# Patient Record
Sex: FEMALE | Race: WHITE | Employment: OTHER | URBAN - METROPOLITAN AREA
[De-identification: names, ages, dates, MRNs, and addresses within clinical notes are randomized per-mention and may not be internally consistent; named-entity substitution may affect disease eponyms.]

---

## 2023-09-09 PROBLEM — E03.9 HYPOTHYROIDISM: Status: ACTIVE | Noted: 2023-09-09

## 2023-09-09 PROBLEM — I10 HYPERTENSION: Status: ACTIVE | Noted: 2023-09-09

## 2023-09-09 PROBLEM — R00.1 BRADYCARDIA: Status: ACTIVE | Noted: 2023-09-09

## 2023-09-09 PROBLEM — F64.0 TRANSSEXUALISM: Status: ACTIVE | Noted: 2023-09-09

## 2023-09-09 PROBLEM — K04.7 TOOTH ABSCESS: Status: ACTIVE | Noted: 2023-09-09

## 2023-09-09 RX ORDER — METOPROLOL TARTRATE 50 MG/1
50 TABLET ORAL 2 TIMES DAILY
COMMUNITY
Start: 2023-03-17 | End: 2024-03-21 | Stop reason: SDUPTHER

## 2023-09-09 RX ORDER — LEVOTHYROXINE SODIUM 88 UG/1
88 TABLET ORAL
COMMUNITY
Start: 2023-04-17 | End: 2024-03-21 | Stop reason: SDUPTHER

## 2023-09-09 RX ORDER — LOSARTAN POTASSIUM AND HYDROCHLOROTHIAZIDE 12.5; 1 MG/1; MG/1
1 TABLET ORAL DAILY
COMMUNITY
Start: 2023-03-17 | End: 2024-03-21 | Stop reason: SDUPTHER

## 2023-09-09 RX ORDER — ESTRADIOL 2 MG/1
4 TABLET ORAL DAILY
COMMUNITY
End: 2023-10-11

## 2024-01-29 DIAGNOSIS — F64.0 TRANSSEXUALISM: ICD-10-CM

## 2024-01-30 RX ORDER — ESTRADIOL 2 MG/1
4 TABLET ORAL DAILY
Qty: 180 TABLET | Refills: 0 | Status: SHIPPED | OUTPATIENT
Start: 2024-01-30 | End: 2024-03-21 | Stop reason: SDUPTHER

## 2024-03-19 PROBLEM — K04.7 DENTAL ABSCESS: Status: RESOLVED | Noted: 2024-03-19 | Resolved: 2024-03-19

## 2024-03-21 ENCOUNTER — OFFICE VISIT (OUTPATIENT)
Dept: PRIMARY CARE | Facility: CLINIC | Age: 64
End: 2024-03-21

## 2024-03-21 VITALS
WEIGHT: 212 LBS | BODY MASS INDEX: 34.07 KG/M2 | TEMPERATURE: 97.8 F | SYSTOLIC BLOOD PRESSURE: 124 MMHG | HEIGHT: 66 IN | HEART RATE: 75 BPM | OXYGEN SATURATION: 97 % | DIASTOLIC BLOOD PRESSURE: 60 MMHG

## 2024-03-21 DIAGNOSIS — F64.0 TRANSSEXUALISM: ICD-10-CM

## 2024-03-21 DIAGNOSIS — Z00.00 ANNUAL PHYSICAL EXAM: Primary | ICD-10-CM

## 2024-03-21 DIAGNOSIS — I10 PRIMARY HYPERTENSION: ICD-10-CM

## 2024-03-21 DIAGNOSIS — R00.1 BRADYCARDIA: ICD-10-CM

## 2024-03-21 DIAGNOSIS — E03.9 ACQUIRED HYPOTHYROIDISM: ICD-10-CM

## 2024-03-21 PROBLEM — K04.7 TOOTH ABSCESS: Status: RESOLVED | Noted: 2023-09-09 | Resolved: 2024-03-21

## 2024-03-21 PROCEDURE — 3074F SYST BP LT 130 MM HG: CPT | Performed by: INTERNAL MEDICINE

## 2024-03-21 PROCEDURE — 3078F DIAST BP <80 MM HG: CPT | Performed by: INTERNAL MEDICINE

## 2024-03-21 PROCEDURE — 1036F TOBACCO NON-USER: CPT | Performed by: INTERNAL MEDICINE

## 2024-03-21 PROCEDURE — 99396 PREV VISIT EST AGE 40-64: CPT | Performed by: INTERNAL MEDICINE

## 2024-03-21 RX ORDER — LOSARTAN POTASSIUM AND HYDROCHLOROTHIAZIDE 12.5; 1 MG/1; MG/1
1 TABLET ORAL DAILY
Qty: 90 TABLET | Refills: 3 | Status: SHIPPED | OUTPATIENT
Start: 2024-03-21 | End: 2025-03-21

## 2024-03-21 RX ORDER — ESTRADIOL 2 MG/1
4 TABLET ORAL DAILY
Qty: 180 TABLET | Refills: 0 | Status: SHIPPED | OUTPATIENT
Start: 2024-03-21

## 2024-03-21 RX ORDER — METOPROLOL TARTRATE 50 MG/1
50 TABLET ORAL 2 TIMES DAILY
Qty: 180 TABLET | Refills: 3 | Status: SHIPPED | OUTPATIENT
Start: 2024-03-21 | End: 2025-03-21

## 2024-03-21 RX ORDER — LEVOTHYROXINE SODIUM 88 UG/1
88 TABLET ORAL
Qty: 90 TABLET | Refills: 3 | Status: SHIPPED | OUTPATIENT
Start: 2024-03-21 | End: 2025-03-21

## 2024-03-21 ASSESSMENT — PAIN SCALES - GENERAL: PAINLEVEL: 0-NO PAIN

## 2024-03-21 ASSESSMENT — PATIENT HEALTH QUESTIONNAIRE - PHQ9
SUM OF ALL RESPONSES TO PHQ9 QUESTIONS 1 AND 2: 0
1. LITTLE INTEREST OR PLEASURE IN DOING THINGS: NOT AT ALL
2. FEELING DOWN, DEPRESSED OR HOPELESS: NOT AT ALL

## 2024-03-21 NOTE — PROGRESS NOTES
[facility]: [facility]  PROGRESS NOTE      Araceli Reed is a 63 y.o. male that is being seen  today for Annual Exam.  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  Patient has hypertension and is fairly controlled.  Patient is on hormonal treatment for transsexualism.  He denies any significant complaints.  He is due for blood work to be done.  Patient does not want to do any screening colonoscopy nor want to have any vaccination.      ROS  Negative for fever or chills  Negative for sore throat, ear pain, nasal discharge  Negative for cough, shortness of breath or wheezing  Negative for chest pain, palpitations, swelling of legs  Negative for abdominal pain, constipation, diarrhea, blood in the stools  Negative for urinary complaints  Negative for headache, dizziness, weakness or numbness  Negative for joint pain  Negative for depression or anxiety  All other systems reviewed and were negative   Vitals:    03/21/24 1058   BP: 124/60   Pulse: 75   Temp: 36.6 °C (97.8 °F)   SpO2: 97%      Vitals:    03/21/24 1058   Weight: 96.2 kg (212 lb)     Body mass index is 34.23 kg/m².  Physical Exam  Constitutional: Patient does not appear to be in any acute distress  Head and Face: NCAT  Eyes: Normal external exam, EOMI  ENT: Normal external inspection of ears and nose. Oropharynx normal.  Cardiovascular: RRR, S1/S2, no murmurs, rubs, or gallops, radial pulses +2, no edema of extremities  Pulmonary: CTAB, no respiratory distress.  Abdomen: +BS, soft, non-tender, nondistended, no guarding or rebound, no masses noted  MSK: No joint swelling, normal movements of all extremities. Range of motion- normal.  Skin- No lesions, contusions, or erythema.  Peripheral puslses palpable bilaterally 2+  Neuro: AAO X3, Cranial nerves 2-12 grossly intact,DTR 2+ in all 4 limbs   Psychiatric: Judgment intact. Appropriate mood and behavior    LABS  "  [unfilled]  No results found for: \"GLUCOSE\", \"CALCIUM\", \"NA\", \"K\", \"CO2\", \"CL\", \"BUN\", \"CREATININE\"  No results found for: \"ALT\", \"AST\", \"GGT\", \"ALKPHOS\", \"BILITOT\"  No results found for: \"WBC\", \"HGB\", \"HCT\", \"MCV\", \"PLT\"  No results found for: \"CHOL\"  No results found for: \"HDL\"  No results found for: \"LDLCALC\"  No results found for: \"TRIG\"  No results found for: \"HGBA1C\"  Other labs not included in the list above were reviewed either before or during this encounter.    History    Past Medical History:   Diagnosis Date    Bradycardia     Dental abscess 03/19/2024    HTN (hypertension)     Hypothyroidism     Transsexualism      History reviewed. No pertinent surgical history.  Family History   Problem Relation Name Age of Onset    Other (hypothyroid) Mother      Heart attack Father      Hypertension Father      Stroke Father       No Known Allergies  Current Outpatient Medications on File Prior to Visit   Medication Sig Dispense Refill    aspirin 81 mg capsule Take 1 capsule by mouth once daily.      estradiol (Estrace) 2 mg tablet TAKE TWO TABLETS BY MOUTH EVERY  tablet 0    levothyroxine (Synthroid, Levoxyl) 88 mcg tablet Take 1 tablet (88 mcg) by mouth once daily in the morning. Take before meals. On an empty stomach      losartan-hydrochlorothiazide (Hyzaar) 100-12.5 mg tablet Take 1 tablet by mouth once daily.      metoprolol tartrate (Lopressor) 50 mg tablet Take 1 tablet by mouth 2 times a day.       No current facility-administered medications on file prior to visit.       There is no immunization history on file for this patient.  Patient's medical history was reviewed and updated either before or during this encounter.  ASSESSMENT / PLAN:  Diagnoses and all orders for this visit:  Annual physical exam  Acquired hypothyroidism  -     TSH with reflex to Free T4 if abnormal; Future  Primary hypertension  -     CBC; Future  -     Comprehensive Metabolic Panel; Future  -     Lipid Panel; " Future  Transsexualism  Bradycardia          Gerard Bartholomew MD

## 2024-03-25 ENCOUNTER — DOCUMENTATION (OUTPATIENT)
Dept: PRIMARY CARE | Facility: CLINIC | Age: 64
End: 2024-03-25

## 2024-03-25 ENCOUNTER — TELEPHONE (OUTPATIENT)
Dept: PRIMARY CARE | Facility: CLINIC | Age: 64
End: 2024-03-25

## 2024-03-25 DIAGNOSIS — E03.9 ACQUIRED HYPOTHYROIDISM: Primary | ICD-10-CM

## 2024-03-25 LAB
ALANINE AMINOTRANSFERASE (SGPT) (U/L) IN SER/PLAS EXTERNAL: 12 U/L
ALBUMIN (G/DL) IN SER/PLAS EXTERNAL: 4.6 G/DL
ALKALINE PHOSPHATASE (U/L) IN SER/PLAS EXTERNAL: 64 U/L
ASPARTATE AMINOTRANSFERASE (SGOT) (U/L) IN SER/PLAS EXTERNAL: 15 U/L
BILIRUBIN TOTAL (MG/DL) IN SER/PLAS EXTERNAL: 0.7 MG/DL
CALCIUM (MG/DL) IN SER/PLAS EXTERNAL: 9.6 MG/DL
CARBON DIOXIDE, TOTAL (MMOL/L) IN SER/PLAS EXTERNAL: 25 MMOL/L
CHLORIDE (MMOL/L) IN SER/PLAS EXTERNAL: 99 MMOL/L
CHOLESTEROL (MG/DL) IN SER/PLAS EXTERNAL: 179 MG/DL
CHOLESTEROL IN HDL (MG/DL) IN SER/PLAS EXTERNAL: 52 MG/DL
CHOLESTEROL IN LDL (MG/DL) IN SERUM OR PLASMA BY CALCULATION EXTERNAL: 107 MG/DL
CREATININE (MG/DL) IN SER/PLAS EXTERNAL: 0.91 MG/DL
ERYTHROCYTE DISTRIBUTION WIDTH (RATIO) BY AUTOMATED COUNT EXTERNAL: 12.3 %
ERYTHROCYTE MEAN CORPUSCULAR HEMOGLOBIN (PG) BY AUTOMATED COUNT EXTERNAL: 31.8 PG
ERYTHROCYTE MEAN CORPUSCULAR HGB CONCENTRATION (G/DL) BY AUTOMATED EXT: 33.6 G/DL
ERYTHROCYTE MEAN CORPUSCULAR VOLUME (FL) BY AUTOMATED COUNT EXTERNAL: 95 FL
ERYTHROCYTES (10*6/UL) IN BLOOD BY AUTOMATED COUNT EXTERNAL: 5.16 X10*6/UL
GLOMERULAR FILTRATION RATE ML/MIN/1.73 SQ M.PREDICTED EXTERNAL: 66 ML/MIN/1.73M*2
GLUCOSE (MG/DL) IN SER/PLAS EXTERNAL: 90 MG/DL
HEMATOCRIT (%) IN BLOOD BY AUTOMATED COUNT EXTERNAL: 48.8 %
HEMOGLOBIN (G/DL) IN BLOOD EXTERNAL: 16.4 G/DL
LEUKOCYTES (10*3/UL) IN BLOOD BY AUTOMATED COUNT EXTERNAL: 6.5 X10*3/UL
PLATELETS (10*3/UL) IN BLOOD AUTOMATED COUNT EXTERNAL: 259 X10*3/UL
POTASSIUM (MMOL/L) IN SER/PLAS EXTERMA;: 4.6 MMOL/L
PROTEIN TOTAL EXTERNAL: 7.4 G/DL
SODIUM (MMOL/L) IN SER/PLAS EXTERNAL: 138 MMOL/L
THYROTROPIN (MIU/L) IN SER/PLAS BY DETECTION LIMIT <= 0.05 MIU/L EXTERNAL: 7.05 MIU/L
TRIGLYCERIDE (MG/DL) IN SER/PLAS EXTERNAL: 112 MG/DL
UREA NITROGEN (MG/DL) IN SER/PLAS EXTERNAL: 23 MG/DL
VLDL EXTERNAL: 20 MG/DL

## 2024-03-25 NOTE — TELEPHONE ENCOUNTER
----- Message from Gerard Bartholomew MD sent at 3/25/2024 11:57 AM EDT -----  Mild elevation of TSH level from before.All other labs are normal.Pt. need to take levothyroxine daily and recheck TSH LEVEL IN 2 MONTHS

## 2024-03-25 NOTE — TELEPHONE ENCOUNTER
Spoke with pt about results, she said she has been taking the thyroid medication before food but was mixing it with other meds. Will starting taking it solo then recheck her BW, lab order mailed to her

## 2024-07-12 DIAGNOSIS — F64.0 TRANSSEXUALISM: ICD-10-CM

## 2024-07-12 RX ORDER — ESTRADIOL 2 MG/1
4 TABLET ORAL DAILY
Qty: 180 TABLET | Refills: 0 | Status: SHIPPED | OUTPATIENT
Start: 2024-07-12

## 2024-10-18 DIAGNOSIS — F64.0 TRANSSEXUALISM: ICD-10-CM

## 2024-10-18 RX ORDER — ESTRADIOL 2 MG/1
4 TABLET ORAL DAILY
Qty: 180 TABLET | Refills: 1 | Status: SHIPPED | OUTPATIENT
Start: 2024-10-18

## 2025-03-27 ENCOUNTER — OFFICE VISIT (OUTPATIENT)
Dept: PRIMARY CARE | Facility: CLINIC | Age: 65
End: 2025-03-27

## 2025-03-27 VITALS
HEIGHT: 66 IN | TEMPERATURE: 96.5 F | BODY MASS INDEX: 34.07 KG/M2 | OXYGEN SATURATION: 97 % | HEART RATE: 53 BPM | SYSTOLIC BLOOD PRESSURE: 120 MMHG | WEIGHT: 212 LBS | DIASTOLIC BLOOD PRESSURE: 88 MMHG

## 2025-03-27 DIAGNOSIS — Z00.00 ANNUAL PHYSICAL EXAM: Primary | ICD-10-CM

## 2025-03-27 DIAGNOSIS — E03.9 ACQUIRED HYPOTHYROIDISM: ICD-10-CM

## 2025-03-27 DIAGNOSIS — F64.0 TRANSSEXUALISM: ICD-10-CM

## 2025-03-27 DIAGNOSIS — I10 PRIMARY HYPERTENSION: ICD-10-CM

## 2025-03-27 PROCEDURE — 99396 PREV VISIT EST AGE 40-64: CPT | Performed by: INTERNAL MEDICINE

## 2025-03-27 PROCEDURE — 3008F BODY MASS INDEX DOCD: CPT | Performed by: INTERNAL MEDICINE

## 2025-03-27 PROCEDURE — 3074F SYST BP LT 130 MM HG: CPT | Performed by: INTERNAL MEDICINE

## 2025-03-27 PROCEDURE — 1036F TOBACCO NON-USER: CPT | Performed by: INTERNAL MEDICINE

## 2025-03-27 PROCEDURE — 3079F DIAST BP 80-89 MM HG: CPT | Performed by: INTERNAL MEDICINE

## 2025-03-27 ASSESSMENT — ENCOUNTER SYMPTOMS
DEPRESSION: 0
LOSS OF SENSATION IN FEET: 0
OCCASIONAL FEELINGS OF UNSTEADINESS: 0

## 2025-03-27 ASSESSMENT — PAIN SCALES - GENERAL: PAINLEVEL_OUTOF10: 0-NO PAIN

## 2025-03-27 NOTE — PROGRESS NOTES
[facility]: [facility]  PROGRESS NOTE      Araceli Reed is a 64 y.o. female that is being seen  today for Annual Exam.  Subjective     Patient is a 64-year-old transgender female with a history of hypertension  and hypothyroidism who is being seen for annual physical examination patient has been doing well.  Patient needs her lab work to be done.  Denies any issues with the medication.  Up-to-date on screenings.      ROS  Negative for fever or chills  Negative for sore throat, ear pain, nasal discharge  Negative for cough, shortness of breath or wheezing  Negative for chest pain, palpitations, swelling of legs  Negative for abdominal pain, constipation, diarrhea, blood in the stools  Negative for urinary complaints  Negative for headache, dizziness, weakness or numbness  Negative for joint pain  Negative for depression or anxiety  All other systems reviewed and were negative   Vitals:    03/27/25 1112   BP: 120/88   Pulse: 53   Temp: 35.8 °C (96.5 °F)   SpO2: 97%      Vitals:    03/27/25 1112   Weight: 96.2 kg (212 lb)     Body mass index is 34.24 kg/m².  Physical Exam  Constitutional: Patient does not appear to be in any acute distress  Head and Face: NCAT  Eyes: Normal external exam, EOMI  ENT: Normal external inspection of ears and nose. Oropharynx normal.  Cardiovascular: RRR, S1/S2, no murmurs, rubs, or gallops, radial pulses +2, no edema of extremities  Pulmonary: CTAB, no respiratory distress.  Abdomen: +BS, soft, non-tender, nondistended, no guarding or rebound, no masses noted  MSK: No joint swelling, normal movements of all extremities. Range of motion- normal.  Skin- No lesions, contusions, or erythema.  Peripheral puslses palpable bilaterally 2+  Neuro: AAO X3, Cranial nerves 2-12 grossly intact,DTR 2+ in all 4 limbs   Psychiatric: Judgment intact. Appropriate mood and behavior    LABS   [unfilled]  Lab Results   Component Value Date    GLUCOSE 90 03/22/2024    CALCIUM 9.6  "03/22/2024     03/22/2024    K 4.6 03/22/2024    CO2 25 03/22/2024    CL 99 03/22/2024    BUN 23 03/22/2024    CREATININE 0.91 03/22/2024     Lab Results   Component Value Date    ALT 12 03/22/2024    AST 15 03/22/2024    ALKPHOS 64 03/22/2024    BILITOT 0.7 03/22/2024     Lab Results   Component Value Date    WBC 6.5 03/22/2024    HGB 16.4 03/22/2024    HCT 48.8 03/22/2024    MCV 95 03/22/2024     03/22/2024     Lab Results   Component Value Date    CHOL 179 03/22/2024     Lab Results   Component Value Date    HDL 52.0 03/22/2024     Lab Results   Component Value Date    LDLCALC 107 03/22/2024     Lab Results   Component Value Date    TRIG 112 03/22/2024     No results found for: \"HGBA1C\"  Other labs not included in the list above were reviewed either before or during this encounter.    History    Past Medical History:   Diagnosis Date    Bradycardia     Dental abscess 03/19/2024    HTN (hypertension)     Hypothyroidism     Transsexualism      History reviewed. No pertinent surgical history.  Family History   Problem Relation Name Age of Onset    Other (hypothyroid) Mother      Heart attack Father      Hypertension Father      Stroke Father       No Known Allergies  Current Outpatient Medications on File Prior to Visit   Medication Sig Dispense Refill    aspirin 81 mg capsule Take 1 capsule by mouth once daily.      estradiol (Estrace) 2 mg tablet TAKE TWO TABLETS BY MOUTH ONCE A  tablet 1    levothyroxine (Synthroid, Levoxyl) 88 mcg tablet Take 1 tablet (88 mcg) by mouth once daily in the morning. Take before meals. On an empty stomach 90 tablet 3    losartan-hydrochlorothiazide (Hyzaar) 100-12.5 mg tablet Take 1 tablet by mouth once daily. 90 tablet 3    metoprolol tartrate (Lopressor) 50 mg tablet Take 1 tablet by mouth 2 times a day. 180 tablet 3     No current facility-administered medications on file prior to visit.       There is no immunization history on file for this " patient.  Patient's medical history was reviewed and updated either before or during this encounter.  ASSESSMENT / PLAN:  Diagnoses and all orders for this visit:  Annual physical exam  Primary hypertension  -     CBC; Future  -     Comprehensive Metabolic Panel; Future  -     Lipid Panel; Future  Acquired hypothyroidism  -     TSH with reflex to Free T4 if abnormal; Future  Transsexualism      Patient seen for annual physical examination.  Patient is due for lab work.  Blood pressure is fairly close with current patient.  Patient needs to be checked.  Denies any significant complaints of      Gerard Bartholomew MD

## 2025-04-01 ENCOUNTER — DOCUMENTATION (OUTPATIENT)
Dept: PRIMARY CARE | Facility: CLINIC | Age: 65
End: 2025-04-01

## 2025-04-01 ENCOUNTER — TELEPHONE (OUTPATIENT)
Dept: PRIMARY CARE | Facility: CLINIC | Age: 65
End: 2025-04-01

## 2025-04-01 LAB
ALANINE AMINOTRANSFERASE (SGPT) (U/L) IN SER/PLAS EXTERNAL: 17 U/L
ALBUMIN (G/DL) IN SER/PLAS EXTERNAL: 4.5 G/DL
ALKALINE PHOSPHATASE (U/L) IN SER/PLAS EXTERNAL: 64 U/L
ASPARTATE AMINOTRANSFERASE (SGOT) (U/L) IN SER/PLAS EXTERNAL: 15 U/L
BILIRUBIN TOTAL (MG/DL) IN SER/PLAS EXTERNAL: 0.6 MG/DL
CALCIUM (MG/DL) IN SER/PLAS EXTERNAL: 9.6 MG/DL
CARBON DIOXIDE, TOTAL (MMOL/L) IN SER/PLAS EXTERNAL: 23 MMOL/L
CHLORIDE (MMOL/L) IN SER/PLAS EXTERNAL: 101 MMOL/L
CHOLESTEROL (MG/DL) IN SER/PLAS EXTERNAL: 204 MG/DL
CHOLESTEROL IN HDL (MG/DL) IN SER/PLAS EXTERNAL: 55 MG/DL
CHOLESTEROL IN LDL (MG/DL) IN SERUM OR PLASMA BY CALCULATION EXTERNAL: 129 MG/DL
CREATININE (MG/DL) IN SER/PLAS EXTERNAL: 1 MG/DL
ERYTHROCYTE DISTRIBUTION WIDTH (RATIO) BY AUTOMATED COUNT EXTERNAL: 11.9 %
ERYTHROCYTE MEAN CORPUSCULAR HEMOGLOBIN (PG) BY AUTOMATED COUNT EXTERNAL: 32.4 PG
ERYTHROCYTE MEAN CORPUSCULAR HGB CONCENTRATION (G/DL) BY AUTOMATED EXT: 33.3 G/DL
ERYTHROCYTE MEAN CORPUSCULAR VOLUME (FL) BY AUTOMATED COUNT EXTERNAL: 97 FL
ERYTHROCYTES (10*6/UL) IN BLOOD BY AUTOMATED COUNT EXTERNAL: 5.09 X10*6/UL
GLOMERULAR FILTRATION RATE ML/MIN/1.73 SQ M.PREDICTED EXTERNAL: 63 ML/MIN/1.73M*2
GLUCOSE (MG/DL) IN SER/PLAS EXTERNAL: 98 MG/DL
HEMATOCRIT (%) IN BLOOD BY AUTOMATED COUNT EXTERNAL: 49.6 %
HEMOGLOBIN (G/DL) IN BLOOD EXTERNAL: 16.5 G/DL
LEUKOCYTES (10*3/UL) IN BLOOD BY AUTOMATED COUNT EXTERNAL: 5.9 X10*3/UL
PLATELETS (10*3/UL) IN BLOOD AUTOMATED COUNT EXTERNAL: 261 X10*3/UL
POTASSIUM (MMOL/L) IN SER/PLAS EXTERMA;: 4.8 MMOL/L
PROTEIN TOTAL EXTERNAL: 7.3 G/DL
SODIUM (MMOL/L) IN SER/PLAS EXTERNAL: 138 MMOL/L
THYROTROPIN (MIU/L) IN SER/PLAS BY DETECTION LIMIT <= 0.05 MIU/L EXTERNAL: 3.65 MIU/L
TRIGLYCERIDE (MG/DL) IN SER/PLAS EXTERNAL: 110 MG/DL
UREA NITROGEN (MG/DL) IN SER/PLAS EXTERNAL: 27 MG/DL
VLDL EXTERNAL: 20 MG/DL

## 2025-04-01 NOTE — TELEPHONE ENCOUNTER
----- Message from Gerard Bartholomew sent at 4/1/2025  9:27 AM EDT -----  All labs are stable.  Mild elevation of cholesterol.  Patient should watch on fat in the diet.

## 2025-04-09 DIAGNOSIS — F64.0 TRANSSEXUALISM: ICD-10-CM

## 2025-04-09 DIAGNOSIS — E03.9 ACQUIRED HYPOTHYROIDISM: ICD-10-CM

## 2025-04-09 DIAGNOSIS — I10 PRIMARY HYPERTENSION: ICD-10-CM

## 2025-04-09 RX ORDER — METOPROLOL TARTRATE 50 MG/1
50 TABLET ORAL 2 TIMES DAILY
Qty: 180 TABLET | Refills: 3 | Status: SHIPPED | OUTPATIENT
Start: 2025-04-09 | End: 2026-04-09

## 2025-04-09 RX ORDER — ESTRADIOL 2 MG/1
4 TABLET ORAL DAILY
Qty: 180 TABLET | Refills: 3 | Status: SHIPPED | OUTPATIENT
Start: 2025-04-09

## 2025-04-09 RX ORDER — LOSARTAN POTASSIUM AND HYDROCHLOROTHIAZIDE 12.5; 1 MG/1; MG/1
1 TABLET ORAL DAILY
Qty: 90 TABLET | Refills: 3 | Status: SHIPPED | OUTPATIENT
Start: 2025-04-09 | End: 2026-04-09

## 2025-04-09 RX ORDER — LEVOTHYROXINE SODIUM 88 UG/1
88 TABLET ORAL
Qty: 90 TABLET | Refills: 3 | Status: SHIPPED | OUTPATIENT
Start: 2025-04-09 | End: 2026-04-09

## 2025-04-09 NOTE — TELEPHONE ENCOUNTER
[facility] 3/27/25, [place] 4/2/26    Estradiol 2mg   Levothyroxine 88mcg  Losartan-hydrochlorothiazide 100-12.5mg  Metoprolol tartrate 50mg    [facility] [facility]